# Patient Record
Sex: MALE | Race: WHITE | NOT HISPANIC OR LATINO | Employment: FULL TIME | URBAN - METROPOLITAN AREA
[De-identification: names, ages, dates, MRNs, and addresses within clinical notes are randomized per-mention and may not be internally consistent; named-entity substitution may affect disease eponyms.]

---

## 2023-06-30 ENCOUNTER — HOSPITAL ENCOUNTER (OUTPATIENT)
Dept: DATA CONVERSION | Facility: HOSPITAL | Age: 48
End: 2023-06-30
Attending: RADIOLOGY | Admitting: RADIOLOGY

## 2023-06-30 DIAGNOSIS — I34.0 NONRHEUMATIC MITRAL (VALVE) INSUFFICIENCY: ICD-10-CM

## 2023-06-30 DIAGNOSIS — I42.9 CARDIOMYOPATHY, UNSPECIFIED (MULTI): ICD-10-CM

## 2023-06-30 DIAGNOSIS — Z72.0 TOBACCO USE: ICD-10-CM

## 2023-06-30 DIAGNOSIS — R07.9 CHEST PAIN, UNSPECIFIED: ICD-10-CM

## 2023-06-30 DIAGNOSIS — I50.9 HEART FAILURE, UNSPECIFIED (MULTI): ICD-10-CM

## 2023-06-30 LAB — POCT GLUCOSE: 99 MG/DL (ref 74–99)

## 2023-11-05 RX ORDER — SPIRONOLACTONE 25 MG/1
TABLET ORAL
COMMUNITY
Start: 2023-06-17 | End: 2023-12-11 | Stop reason: SDUPTHER

## 2023-11-05 RX ORDER — DAPAGLIFLOZIN 10 MG/1
10 TABLET, FILM COATED ORAL DAILY
COMMUNITY
Start: 2023-06-05 | End: 2024-02-19 | Stop reason: SDUPTHER

## 2023-11-05 RX ORDER — SACUBITRIL AND VALSARTAN 49; 51 MG/1; MG/1
1 TABLET, FILM COATED ORAL 2 TIMES DAILY
COMMUNITY
Start: 2023-06-18 | End: 2023-11-07 | Stop reason: DRUGHIGH

## 2023-11-05 RX ORDER — METFORMIN HYDROCHLORIDE 1000 MG/1
1000 TABLET ORAL 2 TIMES DAILY
COMMUNITY
Start: 2023-06-17

## 2023-11-05 RX ORDER — SACUBITRIL AND VALSARTAN 24; 26 MG/1; MG/1
1 TABLET, FILM COATED ORAL 2 TIMES DAILY
COMMUNITY
Start: 2022-12-17 | End: 2023-11-07 | Stop reason: ALTCHOICE

## 2023-11-05 RX ORDER — METHYLPREDNISOLONE 4 MG/1
TABLET ORAL
COMMUNITY
Start: 2023-01-24 | End: 2023-11-07 | Stop reason: ALTCHOICE

## 2023-11-05 RX ORDER — ALBUTEROL SULFATE 90 UG/1
2 AEROSOL, METERED RESPIRATORY (INHALATION) EVERY 6 HOURS PRN
COMMUNITY
Start: 2022-11-16 | End: 2023-11-07 | Stop reason: ALTCHOICE

## 2023-11-05 RX ORDER — ROSUVASTATIN CALCIUM 20 MG/1
20 TABLET, COATED ORAL DAILY
COMMUNITY
Start: 2023-06-17 | End: 2024-02-19 | Stop reason: SDUPTHER

## 2023-11-05 RX ORDER — LOSARTAN POTASSIUM 25 MG/1
25 TABLET ORAL DAILY
COMMUNITY
Start: 2022-11-24 | End: 2023-11-07 | Stop reason: ALTCHOICE

## 2023-11-05 RX ORDER — ONDANSETRON 4 MG/1
TABLET, ORALLY DISINTEGRATING ORAL
COMMUNITY
Start: 2022-11-24 | End: 2023-11-07 | Stop reason: ALTCHOICE

## 2023-11-05 RX ORDER — CODEINE PHOSPHATE AND GUAIFENESIN 10; 100 MG/5ML; MG/5ML
SOLUTION ORAL
COMMUNITY
Start: 2015-11-06 | End: 2024-02-19 | Stop reason: WASHOUT

## 2023-11-05 RX ORDER — LIDOCAINE HYDROCHLORIDE 20 MG/ML
SOLUTION OROPHARYNGEAL
COMMUNITY
Start: 2015-11-06 | End: 2023-11-07 | Stop reason: ALTCHOICE

## 2023-11-05 RX ORDER — HYDROXYZINE HYDROCHLORIDE 50 MG/1
2 TABLET, FILM COATED ORAL NIGHTLY
COMMUNITY
Start: 2015-11-06 | End: 2023-11-07 | Stop reason: ALTCHOICE

## 2023-11-05 RX ORDER — FUROSEMIDE 40 MG/1
40 TABLET ORAL 2 TIMES DAILY
COMMUNITY
Start: 2023-06-17 | End: 2024-02-19 | Stop reason: SDUPTHER

## 2023-11-05 RX ORDER — METOPROLOL SUCCINATE 50 MG/1
50 TABLET, EXTENDED RELEASE ORAL DAILY
COMMUNITY
Start: 2023-06-17 | End: 2023-11-07 | Stop reason: ALTCHOICE

## 2023-11-05 RX ORDER — PANTOPRAZOLE SODIUM 40 MG/1
40 TABLET, DELAYED RELEASE ORAL DAILY
COMMUNITY
Start: 2022-11-24 | End: 2023-11-07 | Stop reason: ALTCHOICE

## 2023-11-05 RX ORDER — SILDENAFIL 100 MG/1
TABLET, FILM COATED ORAL
COMMUNITY
Start: 2023-06-17 | End: 2024-02-19 | Stop reason: SDUPTHER

## 2023-11-07 ENCOUNTER — OFFICE VISIT (OUTPATIENT)
Dept: CARDIOLOGY | Facility: CLINIC | Age: 48
End: 2023-11-07
Payer: COMMERCIAL

## 2023-11-07 VITALS
OXYGEN SATURATION: 98 % | WEIGHT: 169 LBS | DIASTOLIC BLOOD PRESSURE: 75 MMHG | BODY MASS INDEX: 25.03 KG/M2 | HEIGHT: 69 IN | HEART RATE: 78 BPM | SYSTOLIC BLOOD PRESSURE: 114 MMHG

## 2023-11-07 DIAGNOSIS — I42.8 NONISCHEMIC CARDIOMYOPATHY (MULTI): ICD-10-CM

## 2023-11-07 DIAGNOSIS — I50.42 CHRONIC COMBINED SYSTOLIC AND DIASTOLIC CONGESTIVE HEART FAILURE (MULTI): Primary | ICD-10-CM

## 2023-11-07 PROCEDURE — 93000 ELECTROCARDIOGRAM COMPLETE: CPT | Performed by: INTERNAL MEDICINE

## 2023-11-07 PROCEDURE — 99214 OFFICE O/P EST MOD 30 MIN: CPT | Performed by: INTERNAL MEDICINE

## 2023-11-07 RX ORDER — CARVEDILOL 6.25 MG/1
6.25 TABLET ORAL
Qty: 60 TABLET | Refills: 11 | Status: SHIPPED | OUTPATIENT
Start: 2023-11-07 | End: 2024-02-19 | Stop reason: SDUPTHER

## 2023-11-07 NOTE — PROGRESS NOTES
[facility] Advanced Heart Failure Clinic  Primary Care Physician: No primary care provider on file.  Referring Cardiologist: Teodora ([facility])    Date of Visit: 11/07/2023  4:40 PM EST  Location of visit: [address]     HPI:   Mr. Cook is a 48M with a PMHx sig for stage C systolic HF/NICM/HFrEF with severe LV dysfunction currently without an ICD and DM who returns to the Advanced Heart Failure clinic for ongoing evaluation and management of his cardiomyopathy.     Interval hx:   Currently denies chest pain, palpitations, shortness of breath, dyspnea on exertion, orthopnea, negative PND. No edema noted in BLE. Patient denies headaches or recent falls. Patient has complaints of dizziness.     He monitors his vitals at home (averages: /70, HR 70s).     Hospitalizations: November 2022 CHF   Medication adherence: Reports adherence   Diet adherence: Low sodium   Exercise: Treadmill 3-4 days a week     PM/SHx:   stage C systolic HF/NICM/HFrEF with severe LV dysfunction currently without an ICD, DM    SocHx:   Lives in [place] with 2 children  Currently works as the Director of Public Service for [employer]  Denies smoking (he does vape occasionally), denies ETOH, illicits    FamHx:   Father with NICM and recurrent VT      Current Outpatient Medications   Medication Sig Dispense Refill    codeine-guaifenesin (Robitussin-AC)  mg/5 mL syrup 2-3 tsp qhs      Farxiga 10 mg Take 1 tablet (10 mg) by mouth once daily.      furosemide (Lasix) 40 mg tablet Take 1 tablet (40 mg) by mouth 2 times a day.      metFORMIN (Glucophage) 1,000 mg tablet Take 1 tablet (1,000 mg) by mouth 2 times a day.      rosuvastatin (Crestor) 20 mg tablet Take 1 tablet (20 mg) by mouth once daily.      sildenafil (Viagra) 100 mg tablet Take one tablet daily as needed to take s,Instr1 hour before sexual activity]      spironolactone (Aldactone) 25 mg tablet TAKE 1/2 (ONE-HALF) OF A TABLET BY MOUTH  "DAILY WITH BREAKFAST      carvedilol (Coreg) 6.25 mg tablet Take 1 tablet (6.25 mg) by mouth 2 times a day with meals. 60 tablet 11    sacubitriL-valsartan (Entresto)  mg tablet Take 1 tablet by mouth 2 times a day. 60 tablet 11     No current facility-administered medications for this visit.       No Known Allergies      Visit Vitals  /75 (BP Location: Right arm, Patient Position: Sitting)   Pulse 78   Ht 1.753 m (5' 9\")   Wt 76.7 kg (169 lb)   SpO2 98%   BMI 24.96 kg/m²   Smoking Status Never   BSA 1.93 m²        Physical Exam:  On exam Mr. Cook appears his stated age, is alert and oriented x3, and in no acute distress. His sclera are anicteric and his oropharynx has moist mucous membranes. His neck is supple and without thyromegaly. The JVP is <5 cm of water above the right atrium. His cardiac exam has regular rhythm, normal S1, S2. No S3/4. There are no murmurs. His lungs are clear to auscultation bilaterally and there is no dullness to percussion. His abdomen is soft, nontender with normoactive bowel sounds. There is no HJR. The extremities are warm and without edema. The skin is dry. There is no rash present. The distal pulses are 2-3+ in all four extremities. His mood and affect are appropriate for todays encounter.       Cardiac Labs/Diagnostics:  ECG (11/7/23):  Sinus rhythm (HR 80) with isolated PVC    ECG (8/7/23):  Sinus rhythm (HR 77), occ PVCs    cMRI (6/30/23):  Moderate to severe LV hypokinesia, septal fibrosis/scar consistent  with dilated cardiomyopathy.Trivial  mitral valve regurgitation.Bilateral small pleural effusions.    Cardiac cath (1/30/23):  Nonobstructive coronary artery disease.  RA 7      RV 37/7      PA 38/15/23      PCWP 25  CO/CI:          -DANYA: 3.7/1.9      -TD 6/3.1    Impression/Plan:  Mr. Cook is a 48M with a PMHx sig for stage C systolic HF/NICM/HFrEF with severe LV dysfunction currently without an ICD and DM who returns to the Advanced Heart Failure clinic " for ongoing evaluation and management of his cardiomyopathy. At the current time he has functional class II symptoms and appears euvolemic on exam.     1) Stage C chronic systolic HF/NICM/HFrEF with severe LV dysfunction (LVEF 28%; 6/2023) currently without an ICD   Cath (1/2023) without CAD. cMRI with evidence of NICM; no evidence to suggest myocarditis. Father also with DCM and h/o VT so suspect a familial cardiomyopathy. Of note, cMRI with scar burden of 13%.   -c/w carvedilol 6.25 mg bid, entresto 97/103 mg bid, farxiga 10 mg daily, spironolactone 25 mg daily, furosemide 40 mg bid  -refer to cardiac genetics for cardiomyopathy panel given both pt and father have DCM  -will obtain echo now that he is on OMT for HFrEF; pending results, will revisit ICD  -pending echo results, will also consider obtaining a baseline CPET       F/U: 3 months at /[facility]    Dr. Araiza,  Thank you for referring Mr. Cook to the  Advanced Heart Failure Clinic. Please let me know if you have any questions.    ____________________________________________________________  Ephraim Omer,   Section of Advanced Heart Failure and Cardiac Transplantation  Division of Cardiovascular Medicine  [facility]  [facility]

## 2023-11-07 NOTE — PATIENT INSTRUCTIONS
It was a pleasure seeing you today. Please contact myself or my team with any questions.     To reach Dr. Omer' office please call [phone] (Vaishali).   Fax: [phone]   To schedule an appointment call [phone]     If you have any questions or need cardiac medication refills, please call the Heart Failure office at [phone], option 6. You may also contact the  Heart Failure Nursing team via email at [email or web address] (Please include your name and date of birth).         1) Continue your current medications  2) We will repeat an echocardiogram   3) Follow up in 3 months at /[facility]

## 2023-11-09 ENCOUNTER — APPOINTMENT (OUTPATIENT)
Dept: CARDIOLOGY | Facility: CLINIC | Age: 48
End: 2023-11-09
Payer: COMMERCIAL

## 2023-12-11 DIAGNOSIS — I50.42 CHRONIC COMBINED SYSTOLIC AND DIASTOLIC CONGESTIVE HEART FAILURE (MULTI): Primary | ICD-10-CM

## 2023-12-11 RX ORDER — SPIRONOLACTONE 25 MG/1
25 TABLET ORAL DAILY
Qty: 90 TABLET | Refills: 3 | Status: SHIPPED | OUTPATIENT
Start: 2023-12-11 | End: 2024-02-19 | Stop reason: SDUPTHER

## 2023-12-14 ENCOUNTER — HOSPITAL ENCOUNTER (OUTPATIENT)
Dept: CARDIOLOGY | Facility: CLINIC | Age: 48
Discharge: HOME | End: 2023-12-14
Payer: COMMERCIAL

## 2023-12-14 ENCOUNTER — CLINICAL SUPPORT (OUTPATIENT)
Dept: GENETICS | Facility: CLINIC | Age: 48
End: 2023-12-14
Payer: COMMERCIAL

## 2023-12-14 ENCOUNTER — OFFICE VISIT (OUTPATIENT)
Dept: CARDIOLOGY | Facility: CLINIC | Age: 48
End: 2023-12-14
Payer: COMMERCIAL

## 2023-12-14 ENCOUNTER — LAB (OUTPATIENT)
Dept: LAB | Facility: LAB | Age: 48
End: 2023-12-14
Payer: COMMERCIAL

## 2023-12-14 VITALS
HEART RATE: 75 BPM | OXYGEN SATURATION: 97 % | SYSTOLIC BLOOD PRESSURE: 106 MMHG | HEIGHT: 70 IN | WEIGHT: 180 LBS | DIASTOLIC BLOOD PRESSURE: 68 MMHG | BODY MASS INDEX: 25.77 KG/M2

## 2023-12-14 DIAGNOSIS — I42.8 NONISCHEMIC CARDIOMYOPATHY (MULTI): ICD-10-CM

## 2023-12-14 DIAGNOSIS — I50.9 HEART FAILURE, UNSPECIFIED (MULTI): Primary | ICD-10-CM

## 2023-12-14 DIAGNOSIS — I50.9 CONGESTIVE HEART FAILURE, UNSPECIFIED HF CHRONICITY, UNSPECIFIED HEART FAILURE TYPE (MULTI): ICD-10-CM

## 2023-12-14 PROCEDURE — 36415 COLL VENOUS BLD VENIPUNCTURE: CPT

## 2023-12-14 PROCEDURE — 96040 HC GENETIC COUNSELING, EACH 30 MIN: CPT | Performed by: GENETIC COUNSELOR, MS

## 2023-12-14 PROCEDURE — 96040 PR MEDICAL GENETICS COUNSELING EACH 30 MINUTES: CPT | Performed by: GENETIC COUNSELOR, MS

## 2023-12-14 PROCEDURE — 93005 ELECTROCARDIOGRAM TRACING: CPT

## 2023-12-14 ASSESSMENT — PAIN SCALES - GENERAL: PAINLEVEL: 0-NO PAIN

## 2023-12-14 NOTE — PROGRESS NOTES
Subjective   Patient ID: Vidal Cook is a 48 y.o. male who presents for genetic counseling due to a personal history of stage C systolic HF/NICM/HFrEF and family history of NICM in his father. We discussed his condition and the genetic testing options available to him.    Background Information: Vidal was hospitalized in November of 2022 and diagnosed with congestive heart failure. He has been seen in Advanced Heart Failure Clinic, where he was referred to cardiogenetics clinic.     Family History:  A three generation pedigree was collected, and was concerning for the following:    -Father with cardiomyopathy and recurrent VT.  -Paternal cousin with heart transplant in his late 20s-early 30s.    Vidal Cook is of  descent.  Consanguinity and Ashkenazi Gnosticist ancestry was denied.    The rest of the family history was negative for intellectual disability, autism, birth defects, multiple miscarriages, early onset cancer or kidney concerns, and other hereditary conditions.    Assessment/Plan   Genetic Test Ordered: yes    Genetic Counseling:  Familial dilated cardiomyopathy is a genetic form of heart disease. It occurs when cardiac muscle becomes thin and weakened in at least one chamber of the heart, causing the open area of the chamber to dilate. As a result, the heart is unable to pump blood as efficiently as usual. To compensate, the heart attempts to increase the amount of blood being pumped through the heart, leading to further thinning and weakening of the cardiac muscle. Over time, this condition results in heart failure.    It usually takes many years for symptoms of familial dilated cardiomyopathy to cause health problems. They typically begin in mid-adulthood, but can occur at any time from infancy to late adulthood. Signs and symptoms of dilated cardiomyopathy can include arrhythmia, dyspnea, fatigue, syncope, and edema. In some cases, the first sign of the disorder is sudden cardiac  death. The severity of the condition varies among affected individuals, even in members of the same family.      It is estimated that 750,000 people in the [place] have dilated cardiomyopathy, roughly half of these cases are familial. Mutations in more than 30 genes have been found to cause familial dilated cardiomyopathy. These genes provide instructions for making proteins that are found in cardiac muscle cells called cardiomyocytes. Many of these proteins play important roles in the contraction of the cardiac muscle through their association with cell structures called sarcomeres. Sarcomeres are the basic units of muscle contraction; they are made of proteins that generate the mechanical force needed for muscles to contract. Many other proteins associated with dilated cardiomyopathy make up the structural framework (the cytoskeleton) of cardiomyocytes. The remaining proteins play various roles within cardiomyocytes to ensure their proper functioning.    Mutations in the TTN gene account for approximately 20 percent of cases of familial dilated cardiomyopathy. The TTN gene provides instructions for making a protein called titin, which is found in the sarcomeres of many types of muscle cells, including cardiomyocytes. Titin provides structure, flexibility, and stability so sarcomeres. Titin also plays a role in chemical signaling and in assembling new sarcomeres. The TTN gene mutations that cause familial dilated cardiomyopathy result in the production of an abnormally short titin protein. It is unclear how the altered protein causes familial dilated cardiomyopathy, but it is likely that it impairs sarcomere function and disrupts chemical signaling.    It is unclear how mutations in the other genes cause familial dilated cardiomyopathy. It is likely that the changes impair cardiomyocyte function and reduce the ability of these cells to contract, weakening and thinning cardiac muscle.  People with familial  dilated cardiomyopathy often do not have an identified mutation in any of the known associated genes.  The cause of the condition in these individuals is unknown.    Familial dilated cardiomyopathy is described as nonsyndromic or isolated because it typically affects only the heart. However, dilated cardiomyopathy can also occur as part of syndromes that affect other organs and tissues in the body.  These forms of the condition are described as syndromic and are caused by mutations in other genes. Additionally, there are many non-genetic causes of dilated cardiomyopathy, including viral infection and chronic alcohol abuse.    We reviewed options for testing, specifically discussing a cardiomyopathy panel through [facility] that would examine 121 genes. We discussed genetic testing in detail, including the benefits, risks, and limitations of testing. We also reviewed the various possible test results, including positive, negative, and variant of unknown significance (VUS).    After discussion, the patient elected to proceed with genetic testing. Results should be available in 3-4 weeks and will be called out to the patient. Should a likely pathogenic or pathogenic mutation be identified, we will be able to offer family members targeted testing. If no mutation is identified, cardiac screening for all 1st-degree relatives will be recommended.    Noemi Hebert MS [facility]  Licensed Genetic Counselor  [facility]  [phone]    Reviewed by:  Tiffanie Nair MD  Cardiac Electrophysiology  Division of Cardiovascular Medicine    Total time spent on day of encounter: 24 minutes

## 2023-12-30 LAB
ATRIAL RATE: 75 BPM
P AXIS: 15 DEGREES
P OFFSET: 184 MS
P ONSET: 135 MS
PR INTERVAL: 178 MS
Q ONSET: 224 MS
QRS COUNT: 13 BEATS
QRS DURATION: 78 MS
QT INTERVAL: 380 MS
QTC CALCULATION(BAZETT): 424 MS
QTC FREDERICIA: 409 MS
R AXIS: 65 DEGREES
T AXIS: -69 DEGREES
T OFFSET: 414 MS
VENTRICULAR RATE: 75 BPM

## 2024-01-03 LAB — SCAN RESULT: NORMAL

## 2024-01-04 ENCOUNTER — TELEPHONE (OUTPATIENT)
Dept: GENETICS | Facility: CLINIC | Age: 49
End: 2024-01-04
Payer: COMMERCIAL

## 2024-01-04 NOTE — TELEPHONE ENCOUNTER
Vidal Cook is a 48 year old male with a diagnosis of NICM and heart failure. He recently underwent genetic testing to try and determine the cause of his diagnosis. 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 These results were reviewed over the phone today.    RESULTS    GENE  VARIANT  ZYGOSITY  VARIANT  CLASSIFICATION  TTN  [id number] ([id number]*)  heterozygous  PATHOGENIC  ILK  c.475C>G ([id number])  heterozygous  Uncertain Significance  MYLK3  c.413C>A ([id number])  heterozygous  Uncertain Significance    GENETIC COUNSELING  Vidal was found to carry a pathogenic variant, or mutation, in the TTN gene. Approximately 20% of cases of familial dilated cardiomyopathy are thought to be due to a TTN mutation. This mutation helps explain his cardiac history. We informed Vidal that these findings do not change his management, and that he should still follow closely with cardiology.    TTN related cardiomyopathy is inherited in an autosomal dominant fashion. This means that Vidal's children, siblings, and parents have a 1 in 2 risk to have the same pathogenic variant, which would increase their risk for dilated cardiomyopathy. [facility] does offer free testing for family members for up to 6 months after the genetic test result is issued. His family members also have the option to process testing through insurance, or pay out of pocket. They can be seen through our department, or through any other genetic department of their choosing.    We reviewed that Vidal could either pursue genetic testing for his children at this time, to determine whether or not they need cardiac screening now, or his children could be seen in pediatric cardiology annually for evaluation until they decide to consider the option of genetic testing as adults. We discussed the Genetic Information Non-discrimination Act (LAUREL) of 2008 in regards to his children. Per  this federal law, employers (at companies with 15 employees or greater) and health insurance companies (barring  and other  insurances) are forbidden to ask for and use genetic information against another person. As such, health insurance companies cannot ask for genetic information and use those findings to affect coverage or rates. However, luxury insurances such as life insurance, long term care insurance, and/or private disability insurance companies are not forbidden against using genetic information when an individual takes out a new/additional policy in one of those areas.    Vidal was also found to be carrying two variants of uncertain significance. A VUS is a change in the gene from the typical expected result that is not known if it is harmful and associated with a hereditary cardiac disorder, or benign and not associated with a hereditary cardiac disorder.  Over time, as new knowledge is gained, a genetic testing laboratory may be able to reclassify the VUS as either benign or pathogenic.    The reclassification process does not usually happen very quickly, and the process sometimes takes years. More often than not, a VUS is reclassified as benign or likely benign, but sometimes it is reclassified as pathogenic or likely pathogenic. A pathogenic gene change is one that could have significant implications for Vidal's family members. At this time, since the gene change is a VUS, we cannot make any medical management recommendations based on them. Vidal should follow-up with Genetics every 2-3 years to see if the VUS has been reclassified.    We also asked that Vidal keep us appraised as to any changes to his personal and/or family history of cardiac issues, and check back with us in 2-3 years to see if we have any updated information on his VUS. Vidal should have electronic access to this document.  He was asked to call me with any questions following our discussion today.    Noemi  MS [facility]  Genetic Counselor  [facility]  [phone]

## 2024-02-19 ENCOUNTER — HOSPITAL ENCOUNTER (OUTPATIENT)
Dept: CARDIOLOGY | Facility: HOSPITAL | Age: 49
Discharge: HOME | End: 2024-02-19
Payer: COMMERCIAL

## 2024-02-19 ENCOUNTER — OFFICE VISIT (OUTPATIENT)
Dept: CARDIOLOGY | Facility: CLINIC | Age: 49
End: 2024-02-19
Payer: COMMERCIAL

## 2024-02-19 VITALS
OXYGEN SATURATION: 98 % | DIASTOLIC BLOOD PRESSURE: 66 MMHG | BODY MASS INDEX: 26.66 KG/M2 | HEIGHT: 69 IN | SYSTOLIC BLOOD PRESSURE: 100 MMHG | WEIGHT: 180 LBS | HEART RATE: 96 BPM

## 2024-02-19 DIAGNOSIS — N52.9 ERECTILE DYSFUNCTION, UNSPECIFIED ERECTILE DYSFUNCTION TYPE: ICD-10-CM

## 2024-02-19 DIAGNOSIS — I50.42 CHRONIC COMBINED SYSTOLIC AND DIASTOLIC CONGESTIVE HEART FAILURE (MULTI): ICD-10-CM

## 2024-02-19 DIAGNOSIS — I50.20 ACC/AHA STAGE C SYSTOLIC HEART FAILURE (MULTI): Primary | ICD-10-CM

## 2024-02-19 DIAGNOSIS — I42.8 NONISCHEMIC CARDIOMYOPATHY (MULTI): ICD-10-CM

## 2024-02-19 LAB
AORTIC VALVE PEAK VELOCITY: 1.14 M/S
AV PEAK GRADIENT: 5.2 MMHG
AVA (PEAK VEL): 2.61 CM2
EJECTION FRACTION APICAL 4 CHAMBER: 44.8
EJECTION FRACTION: 47 %
LEFT VENTRICLE INTERNAL DIMENSION DIASTOLE: 5.8 CM (ref 3.5–6)
LEFT VENTRICULAR OUTFLOW TRACT DIAMETER: 2 CM
MITRAL VALVE E/A RATIO: 0.7
MITRAL VALVE E/E' RATIO: 7.57
RIGHT VENTRICLE FREE WALL PEAK S': 14.5 CM/S
RIGHT VENTRICLE PEAK SYSTOLIC PRESSURE: 21 MMHG
TRICUSPID ANNULAR PLANE SYSTOLIC EXCURSION: 1.4 CM

## 2024-02-19 PROCEDURE — 93306 TTE W/DOPPLER COMPLETE: CPT | Performed by: INTERNAL MEDICINE

## 2024-02-19 PROCEDURE — 93306 TTE W/DOPPLER COMPLETE: CPT

## 2024-02-19 PROCEDURE — 99214 OFFICE O/P EST MOD 30 MIN: CPT | Performed by: INTERNAL MEDICINE

## 2024-02-19 RX ORDER — CARVEDILOL 6.25 MG/1
6.25 TABLET ORAL 2 TIMES DAILY
Qty: 180 TABLET | Refills: 3 | Status: SHIPPED | OUTPATIENT
Start: 2024-02-19 | End: 2025-02-18

## 2024-02-19 RX ORDER — DAPAGLIFLOZIN 10 MG/1
10 TABLET, FILM COATED ORAL DAILY
Qty: 90 TABLET | Refills: 3 | Status: SHIPPED | OUTPATIENT
Start: 2024-02-19

## 2024-02-19 RX ORDER — SPIRONOLACTONE 25 MG/1
25 TABLET ORAL DAILY
Qty: 90 TABLET | Refills: 3 | Status: SHIPPED | OUTPATIENT
Start: 2024-02-19 | End: 2025-02-18

## 2024-02-19 RX ORDER — SILDENAFIL 100 MG/1
100 TABLET, FILM COATED ORAL AS NEEDED
Qty: 10 TABLET | Refills: 5 | Status: SHIPPED | OUTPATIENT
Start: 2024-02-19

## 2024-02-19 RX ORDER — FUROSEMIDE 40 MG/1
40 TABLET ORAL 2 TIMES DAILY
Qty: 180 TABLET | Refills: 3 | Status: SHIPPED | OUTPATIENT
Start: 2024-02-19

## 2024-02-19 RX ORDER — ROSUVASTATIN CALCIUM 20 MG/1
20 TABLET, COATED ORAL DAILY
Qty: 90 TABLET | Refills: 3 | Status: SHIPPED | OUTPATIENT
Start: 2024-02-19

## 2024-02-19 NOTE — PATIENT INSTRUCTIONS
It was a pleasure seeing you today. Please contact myself or my team with any questions.     To reach Dr. Omer' office please call [phone] (Joyce).   Fax: [phone]   To schedule an appointment call [phone]     If you have any questions or need cardiac medication refills, please call the Heart Failure office at [phone], option 6. You may also contact the  Heart Failure Nursing team via email at [email or web address] (Please include your name and date of birth).        1) Continue your current medications  2) Follow up in 6 months at /[facility]  3) I am going to contact genetics about about testing your kids; if positive, we will get them screened.     Pharmacy  [facility]

## 2024-02-19 NOTE — PROGRESS NOTES
[facility] Advanced Heart Failure Clinic  Primary Care Physician: No Assigned PCP Generic Provider, MD  Referring Provider/Cardiologist: Teodora ([facility])     Date of Visit: 02/19/2024  4:00 PM EST  Location of visit: [address]     HPI:   Mr. Cook is a 48M with a PMHx sig for stage C systolic HF/NICM/HFrEF with severe LV dysfunction currently without an ICD and DM who returns to the Advanced Heart Failure clinic for ongoing evaluation and management of his cardiomyopathy.     Interval hx:   Currently denies chest pain, palpitations, shortness of breath, dyspnea on exertion, orthopnea, PND. No edema noted in BLE. Patient denies headaches or recent falls. Patient has complaints of dizziness.     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 His kids have not yet been tested.     Hospitalizations: Denies   Medication adherence: Reports adherence       PM/SHx:   stage C systolic HF/NICM/HFrEF with severe LV dysfunction currently without an ICD, DM    SocHx:   Lives in [place] with 2 children  Currently works as the Director of Public Service for the [employer]  Denies smoking (he does vape occasionally), denies ETOH, illicits    FamHx:   Father with NICM and recurrent VT      Current Outpatient Medications   Medication Sig Dispense Refill    carvedilol (Coreg) 6.25 mg tablet Take 1 tablet (6.25 mg) by mouth 2 times a day with meals. 60 tablet 11    Farxiga 10 mg Take 1 tablet (10 mg) by mouth once daily.      furosemide (Lasix) 40 mg tablet Take 1 tablet (40 mg) by mouth 2 times a day.      metFORMIN (Glucophage) 1,000 mg tablet Take 1 tablet (1,000 mg) by mouth 2 times a day.      rosuvastatin (Crestor) 20 mg tablet Take 1 tablet (20 mg) by mouth once daily.      sacubitriL-valsartan (Entresto)  mg tablet Take 1 tablet by mouth 2 times a day. 60 tablet 11    sildenafil (Viagra) 100 mg tablet Take one tablet daily as needed to take s,Instr1  "hour before sexual activity]      spironolactone (Aldactone) 25 mg tablet Take 1 tablet (25 mg) by mouth once daily. 90 tablet 3    codeine-guaifenesin (Robitussin-AC)  mg/5 mL syrup 2-3 tsp qhs       No current facility-administered medications for this visit.       No Known Allergies      Visit Vitals  /66 (BP Location: Left arm, Patient Position: Sitting)   Pulse 96   Ht 1.753 m (5' 9\")   Wt 81.6 kg (180 lb)   SpO2 98%   BMI 26.58 kg/m²   Smoking Status Former   BSA 1.99 m²        Physical Exam:  On exam Mr. Cook appears his stated age, is alert and oriented x3, and in no acute distress. His sclera are anicteric and his oropharynx has moist mucous membranes. His neck is supple and without thyromegaly. The JVP is <5 cm of water above the right atrium. His cardiac exam has regular rhythm, normal S1, S2. No S3/4. There are no murmurs. His lungs are clear to auscultation bilaterally and there is no dullness to percussion. His abdomen is soft, nontender with normoactive bowel sounds. There is no HJR. The extremities are warm and without edema. The skin is dry. There is no rash present. The distal pulses are 2-3+ in all four extremities. His mood and affect are appropriate for todays encounter.       Cardiac Labs/Diagnostics:  Echo (2/19/24):  1. Left ventricular systolic function is mildly decreased with a 40-45% estimated ejection fraction.  2. Spectral Doppler shows an impaired relaxation pattern of left ventricular diastolic filling.  3. Mild to moderate mitral valve regurgitation.  4. No evidence of mitral valve prolapse.  5. There is No tricuspid stenosis.  6. RVSP within normal limits.  7. Aortic valve stenosis is not present.  8. There is global hypokinesis of the left ventricle with minor regional variations.    ECG (11/7/23):  Sinus rhythm (HR 80) with isolated PVC    ECG (8/7/23):  Sinus rhythm (HR 77), occ PVCs    cMRI (6/30/23):  Moderate to severe LV hypokinesia, septal fibrosis/scar " consistent with dilated cardiomyopathy.Trivial mitral valve regurgitation. Bilateral small pleural effusions.    Cardiac cath (1/30/23):  Nonobstructive coronary artery disease.  RA 7      RV 37/7      PA 38/15/23      PCWP 25  CO/CI:          -DANYA: 3.7/1.9      -TD 6/3.1      Impression/Plan:  Mr. Cook is a 48M with a PMHx sig for stage C systolic HF/NICM/HFrEF with mild LV dysfunction currently without an ICD and DM who returns to the Advanced Heart Failure clinic for ongoing evaluation and management of his cardiomyopathy. At the current time he has functional class I symptoms and appears euvolemic on exam.     1) Stage C chronic systolic HF/NICM (Familial; TTN)/HFmrEF with mild LV dysfunction (LVEF 28-->40-45%; 2/2024) currently without an ICD   Cath (1/2023) without CAD. cMRI with evidence of NICM; no evidence to suggest myocarditis. Father also with DCM and h/o VT so a familial CM was suspected. 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 LVEF improved with GDMT. Having some positional dizziness, but he wishes to remain on his current medical regimen including loop diuretic.   -c/w carvedilol 6.25 mg bid, entresto 97/103 mg bid, farxiga 10 mg daily, spironolactone 25 mg daily, furosemide 40 mg bid  -recommend children be screened; will contact genetics  -ok to defer ICD for now; he will follow up with Dr. Araiza       F/U: 6 months at /[facility]    Dr. Araiza,  Thank you for referring Mr. Cook to the  Advanced Heart Failure Clinic. Please let me know if you have any questions.        ____________________________________________________________  Ephraim Omer DO  Section of Advanced Heart Failure and Cardiac Transplantation  Division of Cardiovascular Medicine  [facility]  [facility]

## 2024-08-20 ENCOUNTER — APPOINTMENT (OUTPATIENT)
Dept: CARDIOLOGY | Facility: CLINIC | Age: 49
End: 2024-08-20
Payer: COMMERCIAL

## 2024-08-20 VITALS
BODY MASS INDEX: 25.92 KG/M2 | SYSTOLIC BLOOD PRESSURE: 105 MMHG | HEIGHT: 69 IN | HEART RATE: 83 BPM | WEIGHT: 175 LBS | OXYGEN SATURATION: 98 % | DIASTOLIC BLOOD PRESSURE: 69 MMHG

## 2024-08-20 DIAGNOSIS — I42.8 NONISCHEMIC CARDIOMYOPATHY (MULTI): ICD-10-CM

## 2024-08-20 DIAGNOSIS — I50.20 ACC/AHA STAGE C SYSTOLIC HEART FAILURE (MULTI): Primary | ICD-10-CM

## 2024-08-20 PROCEDURE — 3008F BODY MASS INDEX DOCD: CPT | Performed by: INTERNAL MEDICINE

## 2024-08-20 PROCEDURE — 99213 OFFICE O/P EST LOW 20 MIN: CPT | Performed by: INTERNAL MEDICINE

## 2024-08-20 RX ORDER — SPIRONOLACTONE 25 MG/1
12.5 TABLET ORAL DAILY
Qty: 45 TABLET | Refills: 3 | Status: SHIPPED | OUTPATIENT
Start: 2024-08-20 | End: 2025-08-20

## 2024-08-20 RX ORDER — DAPAGLIFLOZIN 10 MG/1
10 TABLET, FILM COATED ORAL DAILY
Qty: 90 TABLET | Refills: 3 | Status: SHIPPED | OUTPATIENT
Start: 2024-08-20

## 2024-08-20 RX ORDER — ROSUVASTATIN CALCIUM 20 MG/1
20 TABLET, COATED ORAL DAILY
Qty: 90 TABLET | Refills: 3 | Status: SHIPPED | OUTPATIENT
Start: 2024-08-20

## 2024-08-20 RX ORDER — CARVEDILOL 6.25 MG/1
6.25 TABLET ORAL 2 TIMES DAILY
Qty: 180 TABLET | Refills: 3 | Status: SHIPPED | OUTPATIENT
Start: 2024-08-20 | End: 2025-08-20

## 2024-08-20 RX ORDER — FUROSEMIDE 40 MG/1
40 TABLET ORAL 2 TIMES DAILY
Qty: 180 TABLET | Refills: 3 | Status: SHIPPED | OUTPATIENT
Start: 2024-08-20

## 2024-08-20 NOTE — PROGRESS NOTES
[facility]  Primary Care Physician: No Assigned PCP Generic Provider, MD  Referring Provider/Cardiologist: Teodora ([facility])     Date of Visit: 08/20/2024  1:40 PM EDT  Location of visit: [address]     HPI:   Mr. Cook is a 49M with a PMHx sig for stage C systolic HF/NICM (TITIN)/HFmrEF with mild LV dysfunction currently without an ICD and DM who returns to the Advanced Heart Failure clinic for ongoing evaluation and management of his cardiomyopathy.     Interval hx:   His kids were screened and negative.     Currently denies chest pain, palpitations, shortness of breath, dyspnea on exertion, orthopnea, PND. No edema noted in BLE. Patient denies headaches or recent falls. Patient has complaints of dizziness and fatigue.     He continues to go to the gym 5 days per week without limitations.     Hospitalizations: Denies   Medication adherence: Reports adherence       PM/SHx:   stage C systolic HF/NICM (TITIN)/HFmrEF with mild LV dysfunction currently without an ICD, DM    SocHx:   Lives in [place] with 2 children  Currently works as the Director of Public Service for [employer]  Denies smoking (he does vape occasionally), denies ETOH, illicits    FamHx:   Father with NICM and recurrent VT      Current Outpatient Medications   Medication Sig Dispense Refill    carvedilol (Coreg) 6.25 mg tablet Take 1 tablet (6.25 mg) by mouth 2 times a day. 180 tablet 3    Farxiga 10 mg Take 1 tablet (10 mg) by mouth once daily. 90 tablet 3    furosemide (Lasix) 40 mg tablet Take 1 tablet (40 mg) by mouth 2 times a day. 180 tablet 3    metFORMIN (Glucophage) 1,000 mg tablet Take 1 tablet (1,000 mg) by mouth 2 times a day.      rosuvastatin (Crestor) 20 mg tablet Take 1 tablet (20 mg) by mouth once daily. 90 tablet 3    sacubitriL-valsartan (Entresto)  mg tablet Take 1 tablet by mouth 2 times a day. 180 tablet 3    sildenafil (Viagra) 100 mg tablet Take  "1 tablet (100 mg) by mouth if needed for erectile dysfunction. 10 tablet 5    spironolactone (Aldactone) 25 mg tablet Take 1 tablet (25 mg) by mouth once daily. 90 tablet 3     No current facility-administered medications for this visit.       No Known Allergies      Visit Vitals  /69 (BP Location: Right arm, Patient Position: Sitting)   Pulse 83   Ht 1.753 m (5' 9\")   Wt 79.4 kg (175 lb)   SpO2 98%   BMI 25.84 kg/m²   Smoking Status Former   BSA 1.97 m²           Physical Exam:  On exam Mr. Cook appears his stated age, is alert and oriented x3, and in no acute distress. His sclera are anicteric and his oropharynx has moist mucous membranes. His neck is supple and without thyromegaly. The JVP is <5 cm of water above the right atrium. His cardiac exam has regular rhythm, normal S1, S2. No S3/4. There are no murmurs. His lungs are clear to auscultation bilaterally and there is no dullness to percussion. His abdomen is soft, nontender with normoactive bowel sounds. There is no HJR. The extremities are warm and without edema. The skin is dry. There is no rash present. The distal pulses are 2+ in all four extremities. His mood and affect are appropriate for todays encounter.       Cardiac Labs/Diagnostics:  Echo (2/19/24):  1. Left ventricular systolic function is mildly decreased with a 40-45% estimated ejection fraction.  2. Spectral Doppler shows an impaired relaxation pattern of left ventricular diastolic filling.  3. Mild to moderate mitral valve regurgitation.  4. No evidence of mitral valve prolapse.  5. There is No tricuspid stenosis.  6. RVSP within normal limits.  7. Aortic valve stenosis is not present.  8. There is global hypokinesis of the left ventricle with minor regional variations.    ECG (11/7/23):  Sinus rhythm (HR 80) with isolated PVC    ECG (8/7/23):  Sinus rhythm (HR 77), occ PVCs    cMRI (6/30/23):  Moderate to severe LV hypokinesia, septal fibrosis/scar consistent with dilated " cardiomyopathy.Trivial mitral valve regurgitation. Bilateral small pleural effusions.    Cardiac cath (1/30/23):  Nonobstructive coronary artery disease.  RA 7      RV 37/7      PA 38/15/23      PCWP 25  CO/CI:          -DANYA: 3.7/1.9      -TD 6/3.1      Impression/Plan:  Mr. Cook is a 49M with a PMHx sig for stage C systolic HF/NICM (TITIN)/HFmrEF with mild LV dysfunction currently without an ICD and DM who returns to the Advanced Heart Failure clinic for ongoing evaluation and management of his cardiomyopathy. At the current time he has functional class I symptoms and appears euvolemic on exam.     1) Stage C chronic systolic HF/NICM (Familial; TITIN)/HFmrEF with mild LV dysfunction (LVEF 28-->40-45%; 2/2024) currently without an ICD   Cath (1/2023) without CAD. cMRI with evidence of NICM; no evidence to suggest myocarditis. Father also with DCM and h/o VT so a familial CM was suspected. 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 Kids screened negative. LVEF improved with GDMT. Having some positional dizziness, but he wishes to remain on his current medical regimen including loop diuretic.    -c/w carvedilol 6.25 mg bid, entresto 97/103 mg bid, farxiga 10 mg daily, spironolactone 12.5 mg daily, furosemide 40 mg bid  -ok to defer ICD for now; he will follow up with Dr. Araiza   -will repeat an echo before follow up      F/U: 1 year at /[facility]    Dr. Araiza,  Thank you for referring Mr. Cook to the  Advanced Heart Failure Clinic. Please let me know if you have any questions.      ____________________________________________________________  Ephraim Omer DO  Section of Advanced Heart Failure and Cardiac Transplantation  Division of Cardiovascular Medicine  [facility]  [facility]

## 2024-08-20 NOTE — PATIENT INSTRUCTIONS
It was a pleasure seeing you today. Please contact myself or my team with any questions.     To reach Dr. Omer' office please call [phone] (Joyce).   Fax: [phone]   To schedule an appointment call [phone]     If you have any questions or need cardiac medication refills, please call the Heart Failure office at [phone], option [phone]. You may also contact the  Heart Failure Nursing team via email at [email or web address] (Please include your name and date of birth).        1) Continue your current medications  2) We will do an echocardiogram before your follow up  3) Follow up in 1 year at /[facility]

## 2025-03-12 DIAGNOSIS — I50.20 ACC/AHA STAGE C SYSTOLIC HEART FAILURE: ICD-10-CM

## 2025-04-21 DIAGNOSIS — I50.20 ACC/AHA STAGE C SYSTOLIC HEART FAILURE: ICD-10-CM

## 2025-04-21 RX ORDER — FUROSEMIDE 40 MG/1
40 TABLET ORAL 2 TIMES DAILY
Qty: 180 TABLET | Refills: 1 | Status: CANCELLED | OUTPATIENT
Start: 2025-04-21

## 2025-04-21 RX ORDER — FUROSEMIDE 40 MG/1
40 TABLET ORAL 2 TIMES DAILY
Qty: 180 TABLET | Refills: 1 | Status: SHIPPED | OUTPATIENT
Start: 2025-04-21

## 2025-08-18 ENCOUNTER — APPOINTMENT (OUTPATIENT)
Dept: CARDIOLOGY | Facility: CLINIC | Age: 50
End: 2025-08-18
Payer: COMMERCIAL

## 2025-08-18 DIAGNOSIS — I50.20 ACC/AHA STAGE C SYSTOLIC HEART FAILURE: Primary | ICD-10-CM

## 2025-08-18 DIAGNOSIS — I42.8 NONISCHEMIC CARDIOMYOPATHY (MULTI): ICD-10-CM

## 2025-08-18 PROCEDURE — 99214 OFFICE O/P EST MOD 30 MIN: CPT | Performed by: INTERNAL MEDICINE

## 2025-08-18 PROCEDURE — 93000 ELECTROCARDIOGRAM COMPLETE: CPT | Performed by: INTERNAL MEDICINE

## 2025-08-18 RX ORDER — CARVEDILOL 6.25 MG/1
6.25 TABLET ORAL 2 TIMES DAILY
Qty: 180 TABLET | Refills: 3 | Status: SHIPPED | OUTPATIENT
Start: 2025-08-18 | End: 2026-08-18

## 2025-08-18 RX ORDER — SPIRONOLACTONE 25 MG/1
12.5 TABLET ORAL DAILY
Qty: 45 TABLET | Refills: 3 | Status: SHIPPED | OUTPATIENT
Start: 2025-08-18 | End: 2026-08-18

## 2025-08-18 RX ORDER — FUROSEMIDE 40 MG/1
40 TABLET ORAL 2 TIMES DAILY
Qty: 180 TABLET | Refills: 3 | Status: SHIPPED | OUTPATIENT
Start: 2025-08-18

## 2025-08-18 RX ORDER — DAPAGLIFLOZIN 10 MG/1
10 TABLET, FILM COATED ORAL DAILY
Qty: 90 TABLET | Refills: 3 | Status: SHIPPED | OUTPATIENT
Start: 2025-08-18

## 2025-08-18 RX ORDER — ROSUVASTATIN CALCIUM 20 MG/1
20 TABLET, COATED ORAL DAILY
Qty: 90 TABLET | Refills: 3 | Status: SHIPPED | OUTPATIENT
Start: 2025-08-18

## 2025-08-18 RX ORDER — SACUBITRIL AND VALSARTAN 97; 103 MG/1; MG/1
1 TABLET ORAL 2 TIMES DAILY
Qty: 180 TABLET | Refills: 3 | Status: SHIPPED | OUTPATIENT
Start: 2025-08-18

## 2026-08-24 ENCOUNTER — APPOINTMENT (OUTPATIENT)
Dept: CARDIOLOGY | Facility: CLINIC | Age: 51
End: 2026-08-24
Payer: COMMERCIAL